# Patient Record
Sex: FEMALE | Race: WHITE | NOT HISPANIC OR LATINO | Employment: FULL TIME | URBAN - METROPOLITAN AREA
[De-identification: names, ages, dates, MRNs, and addresses within clinical notes are randomized per-mention and may not be internally consistent; named-entity substitution may affect disease eponyms.]

---

## 2022-10-10 ENCOUNTER — HOSPITAL ENCOUNTER (EMERGENCY)
Facility: CLINIC | Age: 47
Discharge: HOME | End: 2022-10-10
Attending: EMERGENCY MEDICINE | Admitting: EMERGENCY MEDICINE
Payer: COMMERCIAL

## 2022-10-10 VITALS
WEIGHT: 195.1 LBS | DIASTOLIC BLOOD PRESSURE: 92 MMHG | OXYGEN SATURATION: 98 % | RESPIRATION RATE: 18 BRPM | HEART RATE: 76 BPM | TEMPERATURE: 98.1 F | SYSTOLIC BLOOD PRESSURE: 138 MMHG

## 2022-10-10 DIAGNOSIS — R20.2 PARESTHESIA OF LEFT ARM: ICD-10-CM

## 2022-10-10 LAB
ANION GAP SERPL CALCULATED.3IONS-SCNC: 9 MMOL/L (ref 7–15)
BASOPHILS # BLD AUTO: 0 10E3/UL (ref 0–0.2)
BASOPHILS NFR BLD AUTO: 0 %
BUN SERPL-MCNC: 15.1 MG/DL (ref 6–20)
CALCIUM SERPL-MCNC: 9.5 MG/DL (ref 8.6–10)
CHLORIDE SERPL-SCNC: 100 MMOL/L (ref 98–107)
CREAT SERPL-MCNC: 0.93 MG/DL (ref 0.51–0.95)
DEPRECATED HCO3 PLAS-SCNC: 29 MMOL/L (ref 22–29)
EOSINOPHIL # BLD AUTO: 0.2 10E3/UL (ref 0–0.7)
EOSINOPHIL NFR BLD AUTO: 3 %
ERYTHROCYTE [DISTWIDTH] IN BLOOD BY AUTOMATED COUNT: 12.3 % (ref 10–15)
GFR SERPL CREATININE-BSD FRML MDRD: 75 ML/MIN/1.73M2
GLUCOSE SERPL-MCNC: 81 MG/DL (ref 70–99)
HCT VFR BLD AUTO: 40.6 % (ref 35–47)
HGB BLD-MCNC: 13.6 G/DL (ref 11.7–15.7)
IMM GRANULOCYTES # BLD: 0 10E3/UL
IMM GRANULOCYTES NFR BLD: 0 %
LYMPHOCYTES # BLD AUTO: 1.6 10E3/UL (ref 0.8–5.3)
LYMPHOCYTES NFR BLD AUTO: 22 %
MCH RBC QN AUTO: 32 PG (ref 26.5–33)
MCHC RBC AUTO-ENTMCNC: 33.5 G/DL (ref 31.5–36.5)
MCV RBC AUTO: 96 FL (ref 78–100)
MONOCYTES # BLD AUTO: 0.9 10E3/UL (ref 0–1.3)
MONOCYTES NFR BLD AUTO: 13 %
NEUTROPHILS # BLD AUTO: 4.5 10E3/UL (ref 1.6–8.3)
NEUTROPHILS NFR BLD AUTO: 62 %
NRBC # BLD AUTO: 0 10E3/UL
NRBC BLD AUTO-RTO: 0 /100
PLATELET # BLD AUTO: 318 10E3/UL (ref 150–450)
POTASSIUM SERPL-SCNC: 3.8 MMOL/L (ref 3.4–5.3)
RBC # BLD AUTO: 4.25 10E6/UL (ref 3.8–5.2)
SODIUM SERPL-SCNC: 138 MMOL/L (ref 136–145)
TROPONIN T SERPL HS-MCNC: 10 NG/L
TROPONIN T SERPL HS-MCNC: 7 NG/L
WBC # BLD AUTO: 7.3 10E3/UL (ref 4–11)

## 2022-10-10 PROCEDURE — 250N000013 HC RX MED GY IP 250 OP 250 PS 637: Performed by: EMERGENCY MEDICINE

## 2022-10-10 PROCEDURE — 93005 ELECTROCARDIOGRAM TRACING: CPT

## 2022-10-10 PROCEDURE — A9585 GADOBUTROL INJECTION: HCPCS | Performed by: EMERGENCY MEDICINE

## 2022-10-10 PROCEDURE — 255N000002 HC RX 255 OP 636: Performed by: EMERGENCY MEDICINE

## 2022-10-10 PROCEDURE — 85025 COMPLETE CBC W/AUTO DIFF WBC: CPT | Performed by: EMERGENCY MEDICINE

## 2022-10-10 PROCEDURE — 84484 ASSAY OF TROPONIN QUANT: CPT | Performed by: EMERGENCY MEDICINE

## 2022-10-10 PROCEDURE — 84484 ASSAY OF TROPONIN QUANT: CPT | Mod: 91 | Performed by: EMERGENCY MEDICINE

## 2022-10-10 PROCEDURE — 99284 EMERGENCY DEPT VISIT MOD MDM: CPT

## 2022-10-10 PROCEDURE — 36415 COLL VENOUS BLD VENIPUNCTURE: CPT | Performed by: EMERGENCY MEDICINE

## 2022-10-10 PROCEDURE — 82374 ASSAY BLOOD CARBON DIOXIDE: CPT | Performed by: EMERGENCY MEDICINE

## 2022-10-10 RX ORDER — GADOBUTROL 604.72 MG/ML
8.5 INJECTION INTRAVENOUS ONCE
Status: DISCONTINUED | OUTPATIENT
Start: 2022-10-10 | End: 2022-10-10 | Stop reason: CLARIF

## 2022-10-10 RX ORDER — METHYLPREDNISOLONE 4 MG
TABLET, DOSE PACK ORAL
Qty: 21 TABLET | Refills: 0 | Status: SHIPPED | OUTPATIENT
Start: 2022-10-10

## 2022-10-10 RX ORDER — HYDROXYZINE HYDROCHLORIDE 25 MG/1
25 TABLET, FILM COATED ORAL ONCE
Status: COMPLETED | OUTPATIENT
Start: 2022-10-10 | End: 2022-10-10

## 2022-10-10 RX ORDER — LORAZEPAM 2 MG/ML
0.5 INJECTION INTRAMUSCULAR ONCE
Status: DISCONTINUED | OUTPATIENT
Start: 2022-10-10 | End: 2022-10-10 | Stop reason: HOSPADM

## 2022-10-10 RX ADMIN — HYDROXYZINE HYDROCHLORIDE 25 MG: 25 TABLET, FILM COATED ORAL at 13:31

## 2022-10-10 ASSESSMENT — ENCOUNTER SYMPTOMS: NUMBNESS: 1

## 2022-10-10 ASSESSMENT — ACTIVITIES OF DAILY LIVING (ADL)
ADLS_ACUITY_SCORE: 33
ADLS_ACUITY_SCORE: 35
ADLS_ACUITY_SCORE: 35

## 2022-10-10 NOTE — ED PROVIDER NOTES
History   Chief Complaint:  Numbness       The history is provided by the patient and the spouse.      Kourtney Finn is a 46 year old female with history of hypertension, hyperlipidemia, and GERD who presents with paresthesias and numbness in left arm. For past month, patient reports experiencing numbness and paresthesias in her left arm. She notes that her symptoms have been intermittent and usually occur when she is laying down. Over this past week, patient reports her symptoms worsening and that her numbness and paresthesia occurs now in any position. Patient also notes that here is no alleviating or exacerbating factors, and acknowledges a painful discomfort to her arm and shoulder region. She reports mowing the lawn this weekend and notes that her right calf became numb while she was pushing the mower up a hill, though this ultimately subsided.  Today at approximately 0830, patient began experiencing jaw pain that lasted for ten minutes and chest warmth while she was typing and doing work. She also notes that she took her blood pressure and reports that it was 180/105, which prompted concern and decision to present to the ED for further evaluation.  Patient denies chest pressure. She denies any arm or hand numbness/weakness or incoordination with motor activities.  Over the course of her illness, she has not noted any exacerbation of arm symptoms with exertion. She denies a history of neck issues including a bulging disc or a history of heart problems. Patient notes she does have a family history of heart issues including a brother who had a stroke at 47. Of note, patient reports experiencing one episode of numbness before in her 20s while she was on birth control. She notes that she is no longer on hormonal supplements and received a hysterectomy but notes that she still has one ovary.    Review of Systems   HENT:        (+)jaw pain   Cardiovascular: Negative for chest pain.        (-)chest pressure    Neurological: Positive for numbness.        (+)paresthesias(left arm)   All other systems reviewed and are negative.    Allergies:  The patient has no known allergies.     Medications:  Lexapro   Cozaar  Inderal   Hydrochlorothiazide    Past Medical History:     Hypertension   Hyperlipidemia   GERD  Depression   Anxiety   Ovarian cysts    Past Surgical History:    Arthroscopy   Appendectomy   Salpingooophorectomy   Hysterectomy     Family History:    Stroke   Pancreatic cancer     Social History:  The patient presents to the ED with her .   Patient presents to the ED via private vehicle.     Physical Exam     Patient Vitals for the past 24 hrs:   BP Temp Temp src Pulse Resp SpO2 Weight   10/10/22 1813 (!) 138/92 -- -- 76 18 98 % --   10/10/22 1724 -- -- -- -- -- 97 % --   10/10/22 1709 -- -- -- -- -- 96 % --   10/10/22 1654 -- -- -- -- -- 96 % --   10/10/22 1639 -- -- -- -- -- 95 % --   10/10/22 1624 -- -- -- -- -- 94 % --   10/10/22 1617 -- -- -- -- -- 94 % --   10/10/22 1602 -- -- -- -- -- 94 % --   10/10/22 1547 -- -- -- -- -- 97 % --   10/10/22 1532 -- -- -- -- -- 100 % --   10/10/22 1521 (!) 141/108 -- -- 73 -- 99 % --   10/10/22 1500 -- -- -- 72 -- 100 % --   10/10/22 1425 130/78 -- -- -- -- 95 % --   10/10/22 1410 118/78 -- -- -- -- 96 % --   10/10/22 1400 121/81 -- -- 72 -- 98 % --   10/10/22 1355 -- -- -- -- -- 97 % --   10/10/22 1340 (!) 138/96 -- -- -- -- 99 % --   10/10/22 1326 -- -- -- -- -- 98 % --   10/10/22 1316 (!) 171/122 -- -- 83 -- 98 % --   10/10/22 1024 (!) 156/94 98.1  F (36.7  C) Oral 79 18 98 % 88.5 kg (195 lb 1.6 oz)       Physical Exam  General:              Well-nourished              Speaking in full sentences  Eyes:              Conjunctiva without injection or scleral icterus  ENT:              Moist mucous membranes              Nares patent              Pinnae normal  Neck:              Full ROM              No stiffness appreciated              No tenderness to palpation  over cervical paraspinal musculature bilaterally  Resp:              Lungs CTAB              No crackles, wheezing or audible rubs              Good air movement  CV:                    Normal rate, regular rhythm              S1 and S2 present              No murmur, gallop or rub   2+ radial pulse bilaterally and symmetric   Extremities warm, well-perfused   Cap refill <3 sec distally  GI:              BS present              Abdomen soft without distention              Non-tender to light and deep palpation              No guarding or rebound tenderness  Skin:              Warm, dry, well perfused              No rashes or open wounds on exposed skin  MSK:              Moves all extremities              No focal deformities or swelling  Neuro:              Alert              CN III-XII intact              5/5  strength, thumb extension, OK sign, finger abduction/adduction, SILT in BUE              5/5 ankle dorsi/plantarflexion, knee flexion/extension, SILT in BLE              Answers questions appropriately              Moves all extremities equally              Gait stable  Psych:              Normal affect, normal mood    Emergency Department Course   ECG  ECG taken at 1406, ECG read at 1408  Normal Sinus rhythm    Normal ECG  Rate 70 bpm. MN interval 150 ms. QRS duration 102 ms. QT/QTc 394/425 ms. P-R-T axes 44 60 33.     Laboratory:  Labs Ordered and Resulted from Time of ED Arrival to Time of ED Departure   BASIC METABOLIC PANEL - Normal       Result Value    Sodium 138      Potassium 3.8      Chloride 100      Carbon Dioxide (CO2) 29      Anion Gap 9      Urea Nitrogen 15.1      Creatinine 0.93      Calcium 9.5      Glucose 81      GFR Estimate 75     TROPONIN T, HIGH SENSITIVITY - Normal    Troponin T, High Sensitivity 7     TROPONIN T, HIGH SENSITIVITY - Normal    Troponin T, High Sensitivity 10     CBC WITH PLATELETS AND DIFFERENTIAL    WBC Count 7.3      RBC Count 4.25      Hemoglobin 13.6       Hematocrit 40.6      MCV 96      MCH 32.0      MCHC 33.5      RDW 12.3      Platelet Count 318      % Neutrophils 62      % Lymphocytes 22      % Monocytes 13      % Eosinophils 3      % Basophils 0      % Immature Granulocytes 0      NRBCs per 100 WBC 0      Absolute Neutrophils 4.5      Absolute Lymphocytes 1.6      Absolute Monocytes 0.9      Absolute Eosinophils 0.2      Absolute Basophils 0.0      Absolute Immature Granulocytes 0.0      Absolute NRBCs 0.0          Emergency Department Course:       Reviewed:  I reviewed nursing notes, vitals, past medical history and Care Everywhere    Assessments:  1259 I obtained history and examined the patient as noted above.   1413 I rechecked the patient and explained findings.   1733 I rechecked the patient. At this point I feel that the patient is safe for discharge, and the patient agrees.    Interventions:  1331 Atarax 25mg PO    Disposition:  The patient was discharged to home.       HEART Score  Background  Calculates the overall risk of adverse event in patient's presenting with chest pain.  Based on 5 criteria (each assigned 0-2 points) including suspiciousness of history, EKG, age, risk factors and troponin.    Data  46 year old female  Hypertension  Hyperlipidemia  No results found for: TROPI  Criteria   0-2 points for each of 5 items (maximum of 10 points):  Score 0- History slightly suspicious for coronary syndrome  Score 0- EKG Normal  Score 1- Age 45 to 65 years old  Score 2- Three or more risk factors for or history of atherosclerotic disease  Score 0- Within normal limits for troponin levels  Interpretation  Risk of adverse outcome  Heart Score: 3  Total Score 0-3- Adverse Outcome Risk 2.5% - Supports early discharge with appropriate follow-up        Impression & Plan     Medical Decision Making:  Kourtney Finn is a very pleasant 46-year-old female presenting to the ED for evaluation of numbness.  VS on presentation reveal elevated BP though otherwise are  unremarkable.  Differential diagnosis is broad, including though is not limited to, cervical radiculopathy, peripheral neuropathy, CVA, demyelinating disease/multiple sclerosis, zoster, referred cardiac ischemia, hypertensive urgency, arterial insufficiency, DVT, among others.  Presently, based on above evaluation, precise etiology for patient's discomfort not entirely clear at this time.  Referred cardiac ischemia strongly considered, particularly given patient's family history of vascular disease.  EKG on presentation demonstrated sinus rhythm without findings of acute ischemia, and both initial and repeat high-sensitivity troponin have returned within normal limits, and without greater than 6 point rise on re-check.  She does have atypical features to her symptoms, and that left arm numbness is exacerbated in the supine position, and not associated with exertional activities.  HEART score calculated at 3, correlating with low risk for adverse outcome.  Acknowledging family history as well as risk factors, do feel provocative study would be reasonable, and stress test was ordered through EMR.  Patient informed that she will be contacted regarding having to schedule.  Other etiologies for left arm symptoms were also strongly considered.  In light of her associated discomfort and component of pain, high suspicion for cervical radiculopathy.  Fortunately, no evidence of objective motor or in fact sensory deficits are noted on exam.  She has no bowel or bladder symptoms and I feel this is unlikely represent acute spinal cord compression.  Her extremities otherwise warm and well-perfused, with symmetric and intact distal pulses.  No swelling of the extremities are noted to suggest upper extremity DVT, or arterial thrombosis/occlusion.  She denies any traumatic injuries nor falls to suggest acute bony abnormality.  Compartments remain soft to the upper and lower extremity.  No overlying skin changes are noted to suggest  cellulitis.  She denies any chest or back pain that would raise concern for aortic dissection.  I did consider other intracranial pathology including CVA/TIA, as well as demyelinating disease such as MS.  I did recommend further evaluation including MRI of the brain and cervical spine.  After anxiolysis was administered, patient unfortunately unable to lie still for MRI secondary to claustrophobia.  We offered additional anxiolysis, the patient on multiple occasions and fortunately does not feel she can finish MRI.  We discussed that I cannot definitively exclude the above etiologies .  Overall, pretest probability for cerebral ischemia remains quite low, again given the duration of symptoms, somewhat positional nature of her symptoms, as well as positive symptoms (pain), which would be less expected with cerebral ischemia.  I also offered CT/CTA imaging to evaluate the vasculature, the patient is declining at this time which I feel to be reasonable.  Overall, we had a thorough and thoughtful conversation regarding above differential, and limitations with the above work-up.  At this point, she is preferring discharge home with very close monitoring of symptoms and close outpatient follow-up.  I stressed the importance of follow-up with PCP and she will contact our clinic tomorrow to assist with close outpatient follow-up.  I will provide a prescription for Medrol Dosepak, given high suspicion for cervical radiculopathy, and she currently wishes to think about whether or not she will begin this.  We discussed strict return precautions including any new or worsening symptoms including worsened numbness, weakness, chest pain, shortness of breath, or any other new neurologic symptoms that should prompt immediate return.  Both patient and spouse verbalized understanding of this.  Of note, during ED course, she noted complete resolution of symptoms after 1 tablet of oral hydroxyzine.  She does acknowledge a significant  component of stress as of late, and wonders if this may be contributing.  Certainly this may present with somatic symptoms, though is otherwise a diagnosis of exclusion.  Patient and son feel comfortable with outlined plan of care.  All of there questions have been answered prior to discharge.    Diagnosis:    ICD-10-CM    1. Paresthesia of left arm  R20.2 Exercise Stress Echocardiogram          Discharge Medications:  Discharge Medication List as of 10/10/2022  6:10 PM      START taking these medications    Details   methylPREDNISolone (MEDROL DOSEPAK) 4 MG tablet therapy pack Follow Package Directions, Disp-21 tablet, R-0, Local Print             Scribe Disclosure:  I, Skye Daly, am serving as a scribe at 12:56 PM on 10/10/2022 to document services personally performed by Bandar Sanches MD based on my observations and the provider's statements to me.            Bandar Sanches MD  10/10/22 1942

## 2022-10-10 NOTE — DISCHARGE INSTRUCTIONS
Please monitor your symptoms very closely.  Follow-up with your primary care provider in 2 to 3 days for recheck    An order has also been placed for outpatient stress test.  You should receive a phone call from the heart center about getting this scheduled    Return to the ER if you develop any new or troubling symptoms such as chest pain, chest pressure, shortness of breath, new numbness, weakness, or any other concerns.    We did recommend performing an MRI/CT imaging today, which you have elected to hold on.    You are welcome to return to the ER with any new or troubling symptoms.    Discharge Instructions  Chest Pain    You have been seen today for chest pain or discomfort.  At this time, your provider has found no signs that your chest pain is due to a serious or life-threatening condition, (or you have declined more testing and/or admission to the hospital). However, sometimes there is a serious problem that does not show up right away. Your evaluation today may not be complete and you may need further testing and evaluation.     Generally, every Emergency Department visit should have a follow-up clinic visit with either a primary or a specialty clinic/provider. Please follow-up as instructed by your emergency provider today.  Return to the Emergency Department if:  Your chest pain changes, gets worse, starts to happen more often, or comes with less activity.  You are newly short of breath.  You get very weak or tired.  You pass out or faint.  You have any new symptoms, like fever, cough, numb legs, or you cough up blood.  You have anything else that worries you.    Until you follow-up with your regular provider, please do the following:  Take one aspirin daily unless you have an allergy or are told not to by your provider.  If a stress test appointment has been made, go to the appointment.  If you have questions, contact your regular provider.  Follow-up with your regular provider/clinic as directed; this  is very important.    If you were given a prescription for medicine here today, be sure to read all of the information (including the package insert) that comes with your prescription.  This will include important information about the medicine, its side effects, and any warnings that you need to know about.  The pharmacist who fills the prescription can provide more information and answer questions you may have about the medicine.  If you have questions or concerns that the pharmacist cannot address, please call or return to the Emergency Department.       Remember that you can always come back to the Emergency Department if you are not able to see your regular provider in the amount of time listed above, if you get any new symptoms, or if there is anything that worries you.

## 2022-10-10 NOTE — ED NOTES
Patient returned from MRI without being able to complete the test . Patient was very upset and crying , spoke with patient at length and she was able to decrease her anxiety  however is refusing an MRI today even with iv sedation. Dr. Sanches updated

## 2022-10-10 NOTE — ED TRIAGE NOTES
Patient states she has had numbness and tingling in her left arm for one month with pain that radiates up into her jaw. Patient is having difficulty sleeping as well.  Patient states she took her blood pressure at home and it was elevated.      Triage Assessment     Row Name 10/10/22 1022       Triage Assessment (Adult)    Airway WDL WDL       Respiratory WDL    Respiratory WDL WDL       Skin Circulation/Temperature WDL    Skin Circulation/Temperature WDL WDL       Cardiac WDL    Cardiac WDL WDL       Peripheral/Neurovascular WDL    Peripheral Neurovascular WDL WDL       Cognitive/Neuro/Behavioral WDL    Cognitive/Neuro/Behavioral WDL WDL

## 2022-10-11 LAB
ATRIAL RATE - MUSE: 70 BPM
DIASTOLIC BLOOD PRESSURE - MUSE: NORMAL MMHG
INTERPRETATION ECG - MUSE: NORMAL
P AXIS - MUSE: 44 DEGREES
PR INTERVAL - MUSE: 150 MS
QRS DURATION - MUSE: 102 MS
QT - MUSE: 394 MS
QTC - MUSE: 425 MS
R AXIS - MUSE: 60 DEGREES
SYSTOLIC BLOOD PRESSURE - MUSE: NORMAL MMHG
T AXIS - MUSE: 33 DEGREES
VENTRICULAR RATE- MUSE: 70 BPM

## 2023-04-22 ENCOUNTER — HEALTH MAINTENANCE LETTER (OUTPATIENT)
Age: 48
End: 2023-04-22

## 2023-07-15 ENCOUNTER — HEALTH MAINTENANCE LETTER (OUTPATIENT)
Age: 48
End: 2023-07-15

## 2024-09-01 ENCOUNTER — HEALTH MAINTENANCE LETTER (OUTPATIENT)
Age: 49
End: 2024-09-01

## 2025-05-10 ENCOUNTER — HEALTH MAINTENANCE LETTER (OUTPATIENT)
Age: 50
End: 2025-05-10

## 2025-05-22 ENCOUNTER — APPOINTMENT (OUTPATIENT)
Dept: CT IMAGING | Facility: CLINIC | Age: 50
End: 2025-05-22
Attending: EMERGENCY MEDICINE
Payer: COMMERCIAL

## 2025-05-22 ENCOUNTER — HOSPITAL ENCOUNTER (EMERGENCY)
Facility: CLINIC | Age: 50
Discharge: HOME | End: 2025-05-22
Attending: EMERGENCY MEDICINE
Payer: COMMERCIAL

## 2025-05-22 VITALS
DIASTOLIC BLOOD PRESSURE: 87 MMHG | HEART RATE: 70 BPM | SYSTOLIC BLOOD PRESSURE: 143 MMHG | TEMPERATURE: 97.9 F | RESPIRATION RATE: 18 BRPM | WEIGHT: 200 LBS | OXYGEN SATURATION: 96 %

## 2025-05-22 DIAGNOSIS — H93.13 TINNITUS, BILATERAL: ICD-10-CM

## 2025-05-22 DIAGNOSIS — H53.8 BLURRED VISION: ICD-10-CM

## 2025-05-22 DIAGNOSIS — S16.1XXA CERVICAL STRAIN, INITIAL ENCOUNTER: ICD-10-CM
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
## 2025-05-22 DIAGNOSIS — R51.9 NONINTRACTABLE HEADACHE, UNSPECIFIED CHRONICITY PATTERN, UNSPECIFIED HEADACHE TYPE: ICD-10-CM

## 2025-05-22 LAB
ANION GAP SERPL CALCULATED.3IONS-SCNC: 10 MMOL/L (ref 7–15)
BASOPHILS # BLD AUTO: 0.1 10E3/UL (ref 0–0.2)
BASOPHILS NFR BLD AUTO: 1 %
BUN SERPL-MCNC: 14 MG/DL (ref 6–20)
CALCIUM SERPL-MCNC: 8.9 MG/DL (ref 8.8–10.4)
CHLORIDE SERPL-SCNC: 103 MMOL/L (ref 98–107)
CREAT SERPL-MCNC: 0.92 MG/DL (ref 0.51–0.95)
EGFRCR SERPLBLD CKD-EPI 2021: 75 ML/MIN/1.73M2
EOSINOPHIL # BLD AUTO: 0.2 10E3/UL (ref 0–0.7)
EOSINOPHIL NFR BLD AUTO: 3 %
ERYTHROCYTE [DISTWIDTH] IN BLOOD BY AUTOMATED COUNT: 12.9 % (ref 10–15)
GLUCOSE SERPL-MCNC: 95 MG/DL (ref 70–99)
HCO3 SERPL-SCNC: 24 MMOL/L (ref 22–29)
HCT VFR BLD AUTO: 38.9 % (ref 35–47)
HGB BLD-MCNC: 13.3 G/DL (ref 11.7–15.7)
IMM GRANULOCYTES # BLD: 0.1 10E3/UL
IMM GRANULOCYTES NFR BLD: 1 %
LYMPHOCYTES # BLD AUTO: 1.5 10E3/UL (ref 0.8–5.3)
LYMPHOCYTES NFR BLD AUTO: 22 %
MCH RBC QN AUTO: 31.8 PG (ref 26.5–33)
MCHC RBC AUTO-ENTMCNC: 34.2 G/DL (ref 31.5–36.5)
MCV RBC AUTO: 93 FL (ref 78–100)
MONOCYTES # BLD AUTO: 0.6 10E3/UL (ref 0–1.3)
MONOCYTES NFR BLD AUTO: 9 %
NEUTROPHILS # BLD AUTO: 4.3 10E3/UL (ref 1.6–8.3)
NEUTROPHILS NFR BLD AUTO: 65 %
NRBC # BLD AUTO: 0 10E3/UL
NRBC BLD AUTO-RTO: 0 /100
PLATELET # BLD AUTO: 268 10E3/UL (ref 150–450)
POTASSIUM SERPL-SCNC: 4.1 MMOL/L (ref 3.4–5.3)
RBC # BLD AUTO: 4.18 10E6/UL (ref 3.8–5.2)
SODIUM SERPL-SCNC: 137 MMOL/L (ref 135–145)
WBC # BLD AUTO: 6.7 10E3/UL (ref 4–11)

## 2025-05-22 PROCEDURE — 85048 AUTOMATED LEUKOCYTE COUNT: CPT | Performed by: EMERGENCY MEDICINE

## 2025-05-22 PROCEDURE — 36415 COLL VENOUS BLD VENIPUNCTURE: CPT | Performed by: EMERGENCY MEDICINE

## 2025-05-22 PROCEDURE — 80048 BASIC METABOLIC PNL TOTAL CA: CPT | Performed by: EMERGENCY MEDICINE

## 2025-05-22 PROCEDURE — 250N000011 HC RX IP 250 OP 636: Performed by: EMERGENCY MEDICINE

## 2025-05-22 PROCEDURE — 70496 CT ANGIOGRAPHY HEAD: CPT

## 2025-05-22 PROCEDURE — 250N000009 HC RX 250: Performed by: EMERGENCY MEDICINE

## 2025-05-22 PROCEDURE — 99285 EMERGENCY DEPT VISIT HI MDM: CPT | Mod: 25

## 2025-05-22 PROCEDURE — 70450 CT HEAD/BRAIN W/O DYE: CPT

## 2025-05-22 RX ORDER — LOSARTAN POTASSIUM 100 MG/1
100 TABLET ORAL DAILY
COMMUNITY
Start: 2025-03-03

## 2025-05-22 RX ORDER — IOPAMIDOL 755 MG/ML
67 INJECTION, SOLUTION INTRAVASCULAR ONCE
Status: COMPLETED | OUTPATIENT
Start: 2025-05-22 | End: 2025-05-22

## 2025-05-22 RX ORDER — PROPRANOLOL HYDROCHLORIDE 10 MG/1
TABLET ORAL
COMMUNITY
Start: 2023-08-30

## 2025-05-22 RX ORDER — ESCITALOPRAM OXALATE 5 MG/1
TABLET ORAL
COMMUNITY
Start: 2025-03-12

## 2025-05-22 RX ADMIN — SODIUM CHLORIDE 100 ML: 9 INJECTION, SOLUTION INTRAVENOUS at 12:22

## 2025-05-22 RX ADMIN — IOPAMIDOL 67 ML: 755 INJECTION, SOLUTION INTRAVENOUS at 12:22

## 2025-05-22 ASSESSMENT — ACTIVITIES OF DAILY LIVING (ADL)
ADLS_ACUITY_SCORE: 41
ADLS_ACUITY_SCORE: 41
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
## 2025-05-31 ENCOUNTER — HEALTH MAINTENANCE LETTER (OUTPATIENT)
Age: 50
End: 2025-05-31